# Patient Record
Sex: FEMALE | Race: WHITE | Employment: FULL TIME | URBAN - METROPOLITAN AREA
[De-identification: names, ages, dates, MRNs, and addresses within clinical notes are randomized per-mention and may not be internally consistent; named-entity substitution may affect disease eponyms.]

---

## 2017-12-21 PROBLEM — F33.9 RECURRENT DEPRESSION (HCC): Status: ACTIVE | Noted: 2017-12-21

## 2018-03-28 ENCOUNTER — DOCUMENTATION ONLY (OUTPATIENT)
Dept: NUTRITION | Age: 43
End: 2018-03-28

## 2018-03-28 NOTE — PROGRESS NOTES
Nutrition Counseling:  Receiving partial information for a nutrition counseling referral for pt. Called the office and was advised that the referral will be re-faxed.     Rocky Echavarria MS, [address] [place] [address], [address], [place]  W: [phone]  C: [phone]

## 2018-04-05 ENCOUNTER — TELEPHONE (OUTPATIENT)
Dept: NUTRITION | Age: 43
End: 2018-04-05

## 2018-04-05 NOTE — TELEPHONE ENCOUNTER
Nutrition Counseling:  Pt referred by Dr. Sigifredo Breen. Pt declined offer for insurance check or to schedule nutrition counseling. Will close referral and notify referring practitioner.     Henna Anand, [address] [place] [address], [address] [facility]

## 2019-05-23 PROBLEM — M50.30 DDD (DEGENERATIVE DISC DISEASE), CERVICAL: Status: ACTIVE | Noted: 2019-05-23

## 2019-05-23 PROBLEM — M54.2 NECK PAIN: Status: ACTIVE | Noted: 2019-05-23

## 2019-05-23 PROBLEM — M48.02 CERVICAL STENOSIS OF SPINAL CANAL: Status: ACTIVE | Noted: 2019-05-23
